# Patient Record
Sex: MALE | Race: BLACK OR AFRICAN AMERICAN | NOT HISPANIC OR LATINO | Employment: FULL TIME | URBAN - METROPOLITAN AREA
[De-identification: names, ages, dates, MRNs, and addresses within clinical notes are randomized per-mention and may not be internally consistent; named-entity substitution may affect disease eponyms.]

---

## 2022-05-27 PROBLEM — I12.0 HYPERTENSIVE CHRONIC KIDNEY DISEASE WITH STAGE 5 CHRONIC KIDNEY DISEASE OR END STAGE RENAL DISEASE: Status: ACTIVE | Noted: 2022-01-04

## 2022-05-27 PROBLEM — E55.9 VITAMIN D DEFICIENCY, UNSPECIFIED: Status: ACTIVE | Noted: 2022-01-04

## 2022-05-27 PROBLEM — N18.9 ANEMIA IN CHRONIC KIDNEY DISEASE: Status: ACTIVE | Noted: 2022-01-04

## 2022-05-27 PROBLEM — M79.89 LEG SWELLING: Status: ACTIVE | Noted: 2021-06-03

## 2022-05-27 PROBLEM — E87.6 HYPOKALEMIA: Status: ACTIVE | Noted: 2022-01-04

## 2022-05-27 PROBLEM — E83.30 DISORDER OF PHOSPHORUS METABOLISM, UNSPECIFIED: Status: ACTIVE | Noted: 2022-01-04

## 2022-05-27 PROBLEM — E66.01 MORBID (SEVERE) OBESITY DUE TO EXCESS CALORIES: Status: ACTIVE | Noted: 2022-01-04

## 2022-05-27 PROBLEM — Z48.02 ENCOUNTER FOR REMOVAL OF SUTURES: Status: ACTIVE | Noted: 2022-05-12

## 2022-05-27 PROBLEM — N18.6 END STAGE RENAL DISEASE: Status: ACTIVE | Noted: 2021-06-29

## 2022-05-27 PROBLEM — E78.5 HYPERLIPIDEMIA: Status: ACTIVE | Noted: 2022-05-27

## 2022-05-27 PROBLEM — R11.2 NAUSEA WITH VOMITING, UNSPECIFIED: Status: ACTIVE | Noted: 2022-02-22

## 2022-05-27 PROBLEM — E21.3 HYPERPARATHYROIDISM, UNSPECIFIED: Status: ACTIVE | Noted: 2022-01-04

## 2022-05-27 PROBLEM — D50.9 IRON DEFICIENCY ANEMIA, UNSPECIFIED: Status: ACTIVE | Noted: 2022-01-04

## 2022-05-27 PROBLEM — E11.22 TYPE 2 DIABETES MELLITUS WITH DIABETIC CHRONIC KIDNEY DISEASE: Status: ACTIVE | Noted: 2022-01-04

## 2022-05-27 PROBLEM — D63.1 ANEMIA IN CHRONIC KIDNEY DISEASE: Status: ACTIVE | Noted: 2022-01-04

## 2022-05-27 PROBLEM — E44.1 MILD PROTEIN-CALORIE MALNUTRITION: Status: ACTIVE | Noted: 2022-01-04

## 2022-05-27 PROBLEM — I50.9 HEART FAILURE, UNSPECIFIED: Status: ACTIVE | Noted: 2022-01-04

## 2022-05-27 PROBLEM — E11.9 TYPE 2 DIABETES MELLITUS: Status: ACTIVE | Noted: 2022-05-27

## 2022-05-27 PROBLEM — N25.81 SECONDARY HYPERPARATHYROIDISM OF RENAL ORIGIN: Status: ACTIVE | Noted: 2022-01-04

## 2022-05-27 PROBLEM — H69.90 EUSTACHIAN TUBE DYSFUNCTION: Status: ACTIVE | Noted: 2017-09-29

## 2022-05-27 PROBLEM — R11.10 VOMITING: Status: ACTIVE | Noted: 2022-02-18

## 2022-05-27 PROBLEM — T78.40XA ALLERGY, UNSPECIFIED, INITIAL ENCOUNTER: Status: ACTIVE | Noted: 2022-01-04

## 2022-05-27 PROBLEM — Z23 ENCOUNTER FOR IMMUNIZATION: Status: ACTIVE | Noted: 2022-01-04

## 2022-05-27 PROBLEM — T78.2XXA ANAPHYLACTIC SHOCK, UNSPECIFIED, INITIAL ENCOUNTER: Status: ACTIVE | Noted: 2022-01-04

## 2022-09-29 PROBLEM — Z49.31 ENCOUNTER FOR ADEQUACY TESTING FOR HEMODIALYSIS: Status: ACTIVE | Noted: 2022-09-12

## 2022-09-29 PROBLEM — D64.9 ANEMIA: Status: ACTIVE | Noted: 2022-09-29

## 2022-09-29 PROBLEM — E66.9 OBESITY: Status: ACTIVE | Noted: 2022-01-04

## 2022-09-29 PROBLEM — E10.9 TYPE 1 DIABETES MELLITUS: Status: ACTIVE | Noted: 2022-09-29

## 2022-09-29 PROBLEM — S91.201A: Status: ACTIVE | Noted: 2022-06-16

## 2023-02-24 ENCOUNTER — NURSE TRIAGE (OUTPATIENT)
Dept: ADMINISTRATIVE | Facility: CLINIC | Age: 63
End: 2023-02-24

## 2023-02-24 NOTE — TELEPHONE ENCOUNTER
Pt is inpatient at [facility] and is wanting to be transferred to [facility]. Advised to contact MD in the hospital and discuss a transfer.  Reason for Disposition   [1] Caller requesting NON-URGENT health information AND [2] PCP's office is the best resource    Protocols used: Information Only Call - No Triage-A-

## 2024-04-30 PROBLEM — E66.813 CLASS 3 SEVERE OBESITY DUE TO EXCESS CALORIES WITH SERIOUS COMORBIDITY AND BODY MASS INDEX (BMI) OF 40.0 TO 44.9 IN ADULT: Status: ACTIVE | Noted: 2022-01-04

## 2024-04-30 PROBLEM — I70.261 ATHEROSCLEROSIS OF NATIVE ARTERY OF RIGHT LOWER EXTREMITY WITH GANGRENE: Status: ACTIVE | Noted: 2024-04-30

## 2024-04-30 PROBLEM — I48.0 PAF (PAROXYSMAL ATRIAL FIBRILLATION): Status: ACTIVE | Noted: 2024-04-30

## 2024-04-30 PROBLEM — L97.412: Status: ACTIVE | Noted: 2024-04-30

## 2024-05-01 PROBLEM — D68.9 COAGULATION DEFECT, UNSPECIFIED: Status: ACTIVE | Noted: 2024-05-01

## 2024-07-12 PROBLEM — T78.40XA ALLERGY, UNSPECIFIED, INITIAL ENCOUNTER: Status: RESOLVED | Noted: 2022-01-04 | Resolved: 2024-07-12

## 2024-07-12 PROBLEM — R11.10 VOMITING: Status: RESOLVED | Noted: 2022-02-18 | Resolved: 2024-07-12

## 2024-07-12 PROBLEM — N19 HYPERTENSIVE HEART AND RENAL DISEASE WITH BOTH (CONGESTIVE) HEART FAILURE AND RENAL FAILURE: Status: ACTIVE | Noted: 2023-07-18

## 2024-07-12 PROBLEM — T78.2XXA ANAPHYLACTIC SHOCK, UNSPECIFIED, INITIAL ENCOUNTER: Status: RESOLVED | Noted: 2022-01-04 | Resolved: 2024-07-12

## 2024-07-12 PROBLEM — E11.9 TYPE 2 DIABETES MELLITUS: Status: RESOLVED | Noted: 2022-05-27 | Resolved: 2024-07-12

## 2024-07-12 PROBLEM — S91.201A: Status: RESOLVED | Noted: 2022-06-16 | Resolved: 2024-07-12

## 2024-07-12 PROBLEM — M54.12 CERVICAL RADICULOPATHY AT C8: Status: ACTIVE | Noted: 2020-08-06

## 2024-07-12 PROBLEM — E87.6 HYPOKALEMIA: Status: RESOLVED | Noted: 2022-01-04 | Resolved: 2024-07-12

## 2024-07-12 PROBLEM — D64.9 ANEMIA: Status: RESOLVED | Noted: 2022-09-29 | Resolved: 2024-07-12

## 2024-07-12 PROBLEM — R11.2 NAUSEA WITH VOMITING, UNSPECIFIED: Status: RESOLVED | Noted: 2022-02-22 | Resolved: 2024-07-12

## 2024-07-12 PROBLEM — G56.02 CARPAL TUNNEL SYNDROME OF LEFT WRIST: Status: ACTIVE | Noted: 2020-08-06

## 2024-07-12 PROBLEM — E10.9 TYPE 1 DIABETES MELLITUS: Status: RESOLVED | Noted: 2022-09-29 | Resolved: 2024-07-12

## 2024-07-12 PROBLEM — S88.119A BELOW-KNEE AMPUTATION: Status: ACTIVE | Noted: 2023-07-18

## 2024-07-12 PROBLEM — Z23 ENCOUNTER FOR IMMUNIZATION: Status: RESOLVED | Noted: 2022-01-04 | Resolved: 2024-07-12

## 2024-07-12 PROBLEM — I13.2 HYPERTENSIVE HEART AND RENAL DISEASE WITH BOTH (CONGESTIVE) HEART FAILURE AND RENAL FAILURE: Status: ACTIVE | Noted: 2023-07-18

## 2024-07-12 PROBLEM — Z48.02 ENCOUNTER FOR REMOVAL OF SUTURES: Status: RESOLVED | Noted: 2022-05-12 | Resolved: 2024-07-12

## 2024-07-12 PROBLEM — M79.89 LEG SWELLING: Status: RESOLVED | Noted: 2021-06-03 | Resolved: 2024-07-12

## 2024-08-16 PROBLEM — I73.9 PERIPHERAL VASCULAR DISEASE: Status: ACTIVE | Noted: 2023-07-18

## 2025-04-03 ENCOUNTER — TELEPHONE (OUTPATIENT)
Dept: TRANSPLANT | Facility: CLINIC | Age: 65
End: 2025-04-03
Payer: MEDICARE

## 2025-05-01 ENCOUNTER — TELEPHONE (OUTPATIENT)
Dept: TRANSPLANT | Facility: CLINIC | Age: 65
End: 2025-05-01
Payer: MEDICARE